# Patient Record
Sex: FEMALE | Race: WHITE
[De-identification: names, ages, dates, MRNs, and addresses within clinical notes are randomized per-mention and may not be internally consistent; named-entity substitution may affect disease eponyms.]

---

## 2017-04-19 ENCOUNTER — HOSPITAL ENCOUNTER (OUTPATIENT)
Dept: HOSPITAL 53 - M ONCR | Age: 82
End: 2017-04-19
Attending: RADIOLOGY
Payer: COMMERCIAL

## 2017-04-19 DIAGNOSIS — C52: Primary | ICD-10-CM

## 2017-04-20 NOTE — RADONC
RADIATION ONCOLOGY FOLLOWUP NOTE

 

DATE:  04/19/2017

 

CHART NUMBER:  .

 

DIAGNOSIS:  Vaginal cancer.

 

STAGE:  I, T1N0M0.

 

ECOG PERFORMANCE STATUS:  Zero.

 

FOLLOWUP NOTE:

Ms. Dowell as a very pleasant, 87-year-old white female with the diagnosis of a

stage I, T1N0M0 vaginal vault malignancy who is presenting to us today for

routine followup visit 1 year and 5 month post completion of external beam

radiation therapy.  Of note, the patient refused the brachytherapy portion of

that treatment fully aware of the consequences and risks.

 

The patient reports today that generally she is doing quite well with no

significant difficulties related to her radiation therapy.  She reports that she

was found to have elevated liver enzymes.  She does not drink alcohol.  She is

concerned for possible metastatic malignancy.

 

The patient also reports that she is being followed routinely with Dr. Ruffin

who is doing Pap smears.  She is having no vaginal discharge, urinary or bowel

difficulties or abdominal pain.

 

REVIEW OF SYSTEMS:

The patient's review of systems is noncontributory.  Denies nausea, vomiting,

fevers, chills, night sweats, diplopia, headaches, anxiety or depression,

anorexia, weight loss, visual disturbances, chest pain, urinary or bowel

difficulties, bone pain, or neurological problems.

 

PHYSICAL EXAMINATION:

The patient is a well-developed, well-nourished, 87-year-old female in no acute

distress.

HEENT exam is normocephalic, atraumatic.  Extraocular movements are intact.

There is no palpable cervical, supraclavicular, infraclavicular, axillary, or

inguinal lymphadenopathy present.

Lungs are clear to auscultation and percussion.

Heart has a regular rate and rhythm.

Abdomen is benign with no hepatosplenomegaly, masses, or tenderness.

Skeletal examination reveals no tenderness to pressure or percussion of the bony

skeleton.

Extremities reveal no clubbing, cyanosis, or edema.

Neurologic exam is grossly intact, as is the remainder of the physical

examination.

 

ASSESSMENT:

The patient is scheduled see Dr. Ruffin for GYN examination later this week.  I

have ordered a CT scan of the abdomen and pelvis to be done in order to further

investigate her liver function issues.  Once again, I am concerned for this

patient who did not complete her therapy.

 

I have tentatively scheduled the patient to come back to see us in routine

followup in 6 months' time.  The followup will be adjusted pending the results of

her CT scan as indicated.

 

 

 

 

 

cc:    *Ozzy Varma DO

       *Vaughn Koroma MD

       *Carlee Ruffin MD

       *Junior Whitehead MD

## 2017-04-27 ENCOUNTER — HOSPITAL ENCOUNTER (OUTPATIENT)
Dept: HOSPITAL 53 - M RAD | Age: 82
End: 2017-04-27
Attending: RADIOLOGY
Payer: COMMERCIAL

## 2017-04-27 DIAGNOSIS — N28.1: ICD-10-CM

## 2017-04-27 DIAGNOSIS — R94.5: ICD-10-CM

## 2017-04-27 DIAGNOSIS — K76.89: ICD-10-CM

## 2017-04-27 DIAGNOSIS — C52: Primary | ICD-10-CM

## 2017-04-27 PROCEDURE — 74178 CT ABD&PLV WO CNTR FLWD CNTR: CPT

## 2017-04-28 NOTE — REP
Clinical:  Vaginal carcinoma for restaging.

 

Technique:  Axial contrast enhanced images from the lung bases to the pubic

symphysis using oral and 100 ml Isovue 370 intravenous contrast material with

precontrast and delayed images of the abdomen as well as coronal and sagittal

re-formations.

 

Comparison:  05/18/2016.

 

Findings:

Lung bases are stable.  2 mm noncalcified nodule in the medial right middle lobe

is unchanged.  Visualized portions of the heart and pericardium are normal.

 

Liver demonstrates innumerable cavernous hemangiomas which are unchanged from

prior examinations along with small scattered hypodensities which may reflect

hepatic cysts and are also unchanged in appearance and size.  Spleen, pancreas,

gallbladder, and bilateral adrenal glands are normal.  Kidneys demonstrate stable

cystic changes including 10 cm simple right renal cyst and 5.1 cm left renal

cyst.  The enteric system is without obstruction or acute inflammatory process.

Scattered colonic diverticula noted without acute diverticulitis.  Pelvis

demonstrates normal bladder and evidence for prior hysterectomy.  No pelvic

fluid/ascites.  No free air.  No intraperitoneal or significant retroperitoneal

adenopathy.  Abdominal aorta demonstrates atherosclerotic changes without

aneurysm or dissection.  Musculoskeletal structures demonstrate age-related

degenerative changes without focal osseous abnormality.

 

Impression:

1.  Stable 2 mm nodular density in the right middle lobe.

2.  Stable hepatic cavernous hemangiomas and small hypodensities compatible with

cysts.  No evidence for hepatic metastatic disease.

3.  Stable bilateral renal cysts measuring up to 10 cm in the right kidney.

4.  Colonic diverticula without acute diverticulitis.

5.  Chronic degenerative changes to the musculoskeletal structures and

atherosclerotic changes to the vasculature.

 

 

Signed by

Vaughn Alcaraz MD 04/28/2017 03:03 A

## 2017-11-08 ENCOUNTER — HOSPITAL ENCOUNTER (OUTPATIENT)
Dept: HOSPITAL 53 - M ONCR | Age: 82
End: 2017-11-08
Attending: RADIOLOGY
Payer: COMMERCIAL

## 2017-11-08 DIAGNOSIS — C52: Primary | ICD-10-CM

## 2017-11-10 NOTE — RADONC
RADIATION ONCOLOGY FOLLOWUP

 

DATE:  11/08/2017

 

CHART NUMBER: 

 

DIAGNOSIS:

Vaginal cancer.

 

STAGE:

I, T1N0M0

 

ECOG PERFORMANCE STATUS:

0.

 

FOLLOWUP NOTE:

Ms. Dowell is a very pleasant 87-year-old white female with the diagnosis of a

stage I, T1N0M0 vaginal wall malignancy who is presenting to us today now 2 
years

post completion of the external beam portion of her treatment to once again go

over the possibility and the discussion about the brachytherapy portion of her

treatment.

 

REVIEW OF SYSTEMS:

The patient's review of systems is noncontributory.  Denies nausea, vomiting,

fevers, chills, night sweats, diplopia, headaches, anxiety or depression,

anorexia, weight loss, visual disturbances, chest pain, urinary or bowel

difficulties, bone pain, or neurological problems.

 

PHYSICAL EXAMINATION:

Deferred at this period.

 

ASSESSMENT:

Once again, I have had one of my innumerable conversations with her, perhaps

several score of conversations, including phone conversations and

others explaining to her the need for brachytherapy.  I also explained to her

that I will no longer be begging her to consider this treatment.  I made clear 
to

her once again that she never completed radiation treatments for her, at one

time, very curable malignancy.  



I went through the procedure and logistics of brachytherapy and how it works. 

I made clear once again in no uncertain circumstances the risks of an 
uncontrolled 

GYN cancer. We discussed rectovaginal fistula, as well as fistulas to the 
bladder 

and the ramifications thereof.

 

Once again, the patient said she does not know if she is going to do it. She

may want it or may not want it.  This went around in a [place], as have all

our previous conversations.

 

In summary, I left the patient telling her quite clearly what would happen

without treatment once again.  I also made clear to her that I do not consider

this a recurrence as she never completed treatment of her primary malignancy in

the first place.  Indeed, this is just continuation of her uncontrolled disease.

I made clear that the decision is up to her whether or not she wants

brachytherapy in [place].

 

I did not set of any followup for this woman in our office since she has been

totally noncompliant after many hours of discussions going back 2 years.  



I have recommended Dr. Whitehead and the physicians at [facility], who I have dealt with 
for many

years.  I have reassured her of the excellent quality of their work and their

clinical judgment.  I have also made clear that she could not be in better hands

at this point.  Indeed, we are approaching a last chance for this woman to

actually follow the advice of the excellent physicians in [place].

 

I hope this conversation has been of some benefit to her and she will heed and

understand the dire situation she is putting herself in at this point.

 

 

 

 

cc:    MD Ozzy Sheikh MD Seung Hahn, MD

 

       *MD JENNIFER Lopez

## 2018-08-08 ENCOUNTER — HOSPITAL ENCOUNTER (OUTPATIENT)
Dept: HOSPITAL 53 - M LAB | Age: 83
End: 2018-08-08
Attending: OBSTETRICS & GYNECOLOGY
Payer: COMMERCIAL

## 2018-08-08 DIAGNOSIS — C52: Primary | ICD-10-CM

## 2018-08-08 LAB
ANION GAP: 8 MEQ/L (ref 8–16)
BASO #: 0 10^3/UL (ref 0–0.2)
BASO %: 0.6 % (ref 0–1)
BLOOD UREA NITROGEN: 14 MG/DL (ref 7–18)
CALCIUM LEVEL: 9.9 MG/DL (ref 8.8–10.2)
CARBON DIOXIDE LEVEL: 29 MEQ/L (ref 21–32)
CHLORIDE LEVEL: 107 MEQ/L (ref 98–107)
CREATININE FOR GFR: 0.78 MG/DL (ref 0.55–1.3)
EOS #: 0.1 10^3/UL (ref 0–0.5)
EOSINOPHIL NFR BLD AUTO: 1.7 % (ref 0–3)
GFR SERPL CREATININE-BSD FRML MDRD: > 60 ML/MIN/{1.73_M2} (ref 32–?)
GLUCOSE, FASTING: 94 MG/DL (ref 70–100)
HEMATOCRIT: 37.3 % (ref 36–47)
HEMOGLOBIN: 12.1 G/DL (ref 12–15.5)
IMMATURE GRANULOCYTE %: 0.3 % (ref 0–3)
LYMPH #: 0.8 10^3/UL (ref 1.5–4.5)
LYMPH %: 11.8 % (ref 24–44)
MEAN CORPUSCULAR HEMOGLOBIN: 30.4 PG (ref 27–33)
MEAN CORPUSCULAR HGB CONC: 32.4 G/DL (ref 32–36.5)
MEAN CORPUSCULAR VOLUME: 93.7 FL (ref 80–96)
MONO #: 0.3 10^3/UL (ref 0–0.8)
MONO %: 4.8 % (ref 0–5)
NEUTROPHILS #: 5.3 10^3/UL (ref 1.8–7.7)
NEUTROPHILS %: 80.8 % (ref 36–66)
NRBC BLD AUTO-RTO: 0 % (ref 0–0)
PLATELET COUNT, AUTOMATED: 213 10^3/UL (ref 150–450)
POTASSIUM SERUM: 4.2 MEQ/L (ref 3.5–5.1)
RED BLOOD COUNT: 3.98 10^6/UL (ref 4–5.4)
RED CELL DISTRIBUTION WIDTH: 14.9 % (ref 11.5–14.5)
SODIUM LEVEL: 144 MEQ/L (ref 136–145)
WHITE BLOOD COUNT: 6.5 10^3/UL (ref 4–10)

## 2018-08-08 PROCEDURE — 80048 BASIC METABOLIC PNL TOTAL CA: CPT

## 2019-06-05 ENCOUNTER — HOSPITAL ENCOUNTER (OUTPATIENT)
Dept: HOSPITAL 53 - M LAB | Age: 84
End: 2019-06-05
Attending: FAMILY MEDICINE
Payer: MEDICARE

## 2019-06-05 DIAGNOSIS — I10: Primary | ICD-10-CM

## 2019-06-05 DIAGNOSIS — J44.9: ICD-10-CM

## 2019-06-05 DIAGNOSIS — C52: ICD-10-CM

## 2019-06-05 LAB
BASOPHILS # BLD AUTO: 0 10^3/UL (ref 0–0.2)
BASOPHILS NFR BLD AUTO: 0.7 % (ref 0–1)
EOSINOPHIL # BLD AUTO: 0.2 10^3/UL (ref 0–0.5)
EOSINOPHIL NFR BLD AUTO: 3.4 % (ref 0–3)
HCT VFR BLD AUTO: 39.3 % (ref 36–47)
HGB BLD-MCNC: 12.6 G/DL (ref 12–15.5)
LYMPHOCYTES # BLD AUTO: 1 10^3/UL (ref 1.5–4.5)
LYMPHOCYTES NFR BLD AUTO: 17.4 % (ref 24–44)
MCH RBC QN AUTO: 30.1 PG (ref 27–33)
MCHC RBC AUTO-ENTMCNC: 32.1 G/DL (ref 32–36.5)
MCV RBC AUTO: 94 FL (ref 80–96)
MONOCYTES # BLD AUTO: 0.3 10^3/UL (ref 0–0.8)
MONOCYTES NFR BLD AUTO: 5.7 % (ref 0–5)
NEUTROPHILS # BLD AUTO: 4.1 10^3/UL (ref 1.8–7.7)
NEUTROPHILS NFR BLD AUTO: 72.6 % (ref 36–66)
PLATELET # BLD AUTO: 185 10^3/UL (ref 150–450)
RBC # BLD AUTO: 4.18 10^6/UL (ref 4–5.4)
WBC # BLD AUTO: 5.6 10^3/UL (ref 4–10)

## 2019-07-09 ENCOUNTER — HOSPITAL ENCOUNTER (OUTPATIENT)
Dept: HOSPITAL 53 - M RAD | Age: 84
End: 2019-07-09
Attending: OBSTETRICS & GYNECOLOGY
Payer: MEDICARE

## 2019-07-09 DIAGNOSIS — Z85.41: Primary | ICD-10-CM

## 2019-07-09 PROCEDURE — 74177 CT ABD & PELVIS W/CONTRAST: CPT

## 2019-07-09 PROCEDURE — 71260 CT THORAX DX C+: CPT

## 2019-07-09 NOTE — REPVR
EXAM: 

 CT Abdomen and Pelvis With Contrast 



EXAM DATE/TIME: 

 7/9/2019 6:42 PM 



CLINICAL HISTORY: 

 89 years old, female; Condition or disease; Other: Cervical; Follow-up 

oncological assessment; No known metastasis; Tumor surgically removed: 

Hysterectomy; Current or recent treatment: Other: I dont know; Additional info: 

Mets? HX of cervical CA 



TECHNIQUE: 

 Imaging protocol: Axial computed tomography images of the abdomen and pelvis 

with intravenous contrast. Coronal and sagittal reformatted images were created 

and reviewed. 

 Radiation optimization: All CT scans at this facility use at least one of 

these dose optimization techniques: automated exposure control; mA and/or kV 

adjustment per patient size (includes targeted exams where dose is matched to 

clinical indication); or iterative reconstruction. 

 Contrast material: ; Contrast volume: 100 ml; Contrast route: IV; 



COMPARISON: 

 CT ABD PELVIS W/O FOL BY WIT 4/27/2017 3:06 PM 



FINDINGS: 



 Liver: Examination of the liver demonstrates a lobular surface contour, and 

enlargement of the left and caudate lobes, findings consistent with cirrhosis. 

Multiple hepatic hemangiomas measure up to 2.5 x 5 x 6.2 cm in the inferior 

aspect of the right lobe of the liver. Small hepatic cysts measure up to 7 mm. 

 Gallbladder and bile ducts: Normal. No calcified stones. No ductal dilation. 

 Pancreas: Normal. No ductal dilation. 

 Spleen: Normal. No splenomegaly. 

 Adrenals: Normal. No mass. 

 Kidneys and ureters: Multiple bilateral renal cysts measure up to 7.3 x 8.1 x 

8.9 cm in the right kidney. 

 Stomach and bowel: There is increased feces throughout the colon consistent 

with constipation. 

 Appendix: No evidence of appendicitis. 

 Intraperitoneal space: Normal. No free air. No significant fluid collection. 

 Vasculature: The aorta demonstrates mild atherosclerotic calcification. 

 Lymph nodes: Normal. No enlarged lymph nodes. 



 Bladder: Thickened left lateral bladder wall associated with mucosal 

enhancement, findings which may indicate inflammation, possibly secondary to 

cystitis. Clinical correlation suggested. 

 Reproductive: There has been a hysterectomy. Enhancement of the upper vagina 

or cervical remnant in the event of a prior supracervical hysterectomy. There 

is thickening of the wall with maximum wall thickness of 9 mm and overall 

cross-sectional measurements of 3.3 x 2.1 cm. Finding may represent cervical 

neoplasm considering history and should be correlated clinically. 



 Bones/joints: Osteoporosis. Levoscoliosis. Mild central spinal stenosis at 

L2-3, moderate to severe central spinal stenosis at L3-4 and L4-5. Bilateral 

facet joint arthropathy L5-S1. 

 Soft tissues: Unremarkable. 



IMPRESSION: 

1. Examination of the liver demonstrates a lobular surface contour, and 

enlargement of the left and caudate lobes, findings consistent with cirrhosis. 

2. Multiple hepatic hemangiomas measure up to 2.5 x 5 x 6.2 cm in the inferior 

aspect of the right lobe of the liver. 

3. Multiple bilateral renal cysts measure up to 7.3 x 8.1 x 8.9 cm in the right 

kidney. 

4. There has been a hysterectomy. 

5. Thickened left lateral bladder wall associated with mucosal enhancement, 

findings which may indicate inflammation, possibly secondary to cystitis. 

Clinical correlation suggested. 

6. There is increased feces throughout the colon consistent with constipation. 

7. Enhancement of the upper vagina or cervical remnant in the event of a prior 

supracervical hysterectomy. There is thickening of the upper vaginal or 

cervical wall as described above. Finding may represent cervical neoplasm 

considering history and should be correlated clinically. 



Electronically signed by: Dean Coello On 07/09/2019  19:53:48 PM

## 2019-07-09 NOTE — REPVR
EXAM: 

 CT Chest With Contrast 



EXAM DATE/TIME: 

 7/9/2019 6:42 PM 



CLINICAL HISTORY: 

 89 years old, female; Condition or disease; Other: Cancer; Follow-up 

oncological assessment; Primary cancer: Cervical; No known metastasis; Tumor 

surgically removed: Hysterectomy; Current or recent treatment: Other: Unknown; 

Additional info: Mets? HX of cervical CA 



TECHNIQUE: 

 Imaging protocol: Axial computed tomography images of the chest with 

intravenous contrast. Coronal and sagittal reformatted images were created and 

reviewed. 

 Radiation optimization: All CT scans at this facility use at least one of 

these dose optimization techniques: automated exposure control; mA and/or kV 

adjustment per patient size (includes targeted exams where dose is matched to 

clinical indication); or iterative reconstruction. 

 Contrast material: ; Contrast volume: 100 ml; Contrast route: IV; 



COMPARISON: 

 CR Chest, 2 view PA, Lat 12/9/2015 4:54 PM 



FINDINGS: 

 Lungs: Calcified granuloma right upper lobe. Mild centrilobular emphysema in 

the upper lung zones. COPD.

 Pleural space: Unremarkable. No pneumothorax. No pleural effusion. 

 Heart: Unremarkable. No cardiomegaly. No pericardial effusion. 

 Aorta: Fusiform dilatation of the ascending thoracic aorta measures 4.6 cm 

maximally. Aortic root dilated at the level of the mid sinuses of Valsalva 3.7 

cm. No dissection. The aorta demonstrates mild atherosclerotic calcification. 

 Lymph nodes: Unremarkable. No enlarged lymph nodes. 

 Bones/joints: The spine demonstrates mild degenerative changes. 

 Soft tissues: Unremarkable. 



IMPRESSION: 

1. Fusiform dilatation of the ascending thoracic aorta measures 4.6 cm 

maximally. Aortic root dilated at the level of the mid sinuses of Valsalva 3.7 

cm. No dissection. 

2. Mild centrilobular emphysema. COPD.



Electronically signed by: Dean Coello On 07/09/2019  19:14:32 PM

## 2019-07-30 VITALS — SYSTOLIC BLOOD PRESSURE: 158 MMHG | DIASTOLIC BLOOD PRESSURE: 93 MMHG

## 2019-07-31 NOTE — MEDONC
MEDICAL ONCOLOGY PROGRESS NOTE

 

DATE OF SERVICE: 2019

 

The patient is here today on evaluation of vaginal carcinoma.

 

 

 This is a very pleasant 89-year-old white female who had initially presented in

2015 with an area that was redness around the area in the vagina.  The

biopsy was consistent with vaginal carcinoma in situ where she had been referred

to the gynecological oncologist who did a biopsy showing squamous cell carcinoma.

An MRI of the pelvis had shown a mass in the vaginal area 2 x 2 x 1.5 x 2.2 and a

PET scan was done which had shown no signs of any distant metastatic disease or

any disease extension.  The patient was given pelvic radiation therapy here at

[facility] which she completed in 2015, total dose was 4500

gray.  It was also recommended that she receive interstitial brachytherapy but

the patient had declined that treatment.  Subsequently, the patient had gone for

CO2 laser ablation in  due to vaginal dysplasia and in 2017 she had

developed a high-grade squamous intraepithelial lesion.  A Pap smear in 2017

was suspicious for squamous cell carcinoma and a vaginal biopsy had shown vaginal

carcinoma in situ.  MRI had been done in  showing no invasion of the bladder

or rectum and she had then undergone JOY brachytherapy treatment which she

completed in 2017.  Status post completion, she had a vaginal biopsy in

2019 showing invasive squamous cell carcinoma.  She then had a staging

examination which had shown a CT scan of the chest which had shown a calcified

granuloma in the right upper lung and on the abdomen and pelvis the liver showed

multiple hepatic hemangiomas, but no evidence of any disease.  The patient is no

longer a candidate for any further radiation therapy due to her prior dosing

brachytherapy etc and she had been seen by Dr. Ruffin in [place] who had

recommended that her options would be chemotherapy, with possibly carboplatinum,

Taxol and Avastin.

 

PAST MEDICAL HISTORY:

The patient's past medical history is above noted for vaginal carcinoma and

radiation therapy.  She is status post hysterectomy in 1970 and status post

breast lumpectomy in , benign disease.  She has a history of hypertension,

hypothyroidism, osteoporosis, peptic ulcer disease, arthritis and varicose

veins.

 

FAMILY HISTORY

Mother  at the age of 66.  Her father  at the age of 62.  She has

children who were 55, 62 and 65 years of age.

 

REVIEW OF SYSTEMS:

She denies any abdominal pain, any vaginal bleeding, any discomfort on sitting.

She has had no discharge.  She has had no recent problems with any dental work,

vision.  She does not wear glasses.  She has had no problems swallowing, chest

pain, hemoptysis or palpitations.  No lower leg swelling.

 

PHYSICAL EXAMINATION:

The patient's appearance is younger than stated age of 89.  Her height is 160 cm.

Her BSA is 1.56, BMI is 21.4.

Vital signs: Her temperature is 96.8, pulse is 65, respiratory rate is 18, BP is

158/93, pulse oximetry is 99.

Her HEENT is normocephalic, atraumatic.  PERRL.  EOMI.  Sclerae is otherwise

white, is nonicteric.  Oropharynx is otherwise clear.  Her neck is supple with no

adenopathy.  Chest is decreased breath sounds at the bases.

Cardiovascular:  S1 and S2 are appreciated with no murmurs.

Her abdomen is otherwise soft, nontender.  No hepatosplenomegaly.  No ascites.

No guarding, no rebound.

Her extremities show no cyanosis, no clubbing or any edema.

 

LABORATORY:

On her laboratory examination her WBC count is 5.6, hemoglobin is 12.6 over 39.3,

RDW of 15.1, platelets 185, neutrophils of 76, lymphocytes of 17, serum

chemistries show are currently pending.

 

ASSESSMENT:

At this time is recurrent vaginal carcinoma with no signs of any metastatic

disease in an 89-year-old white female with marked heavily pretreated therapy now

no longer a candidate for radiation therapy.

 

PLAN:

I have had a discussion with the patient.  At the age of 89 she does not wish to

pursue any chemotherapy options.  I have gone over the risks, side effects and

benefits of chemotherapy and at this point the patient would like to have

observation only.  She has no symptoms.  No abdominal pain.  No vaginal

discharge.  No bleeding and will be coming up every 3 months on routine followup.

If at that time she has a change of symptoms and then palliative therapies will

be considered at that time.  Her choice appears reasonable considering the low

bulk of disease and the lack of symptoms.

 

 

 

Electronically Signed by

Vanessa Skinner MD 2019 10:38 A

 

 

 

 

 

 

 

 

DD:  Vanessa Skinner MD 2019 09:52 A

DT:  np 2019 10:09 P

 

CC:   Ozzy Varma MD

## 2019-10-30 VITALS — SYSTOLIC BLOOD PRESSURE: 143 MMHG | DIASTOLIC BLOOD PRESSURE: 91 MMHG

## 2019-10-30 LAB
ALBUMIN SERPL BCG-MCNC: 3.4 GM/DL (ref 3.5–5.2)
AMORPH SED URNS QL MICRO: (no result)
APPEARANCE UR: (no result)
BACTERIA UR QL AUTO: NEGATIVE
BILIRUB UR QL STRIP.AUTO: NEGATIVE
BUN SERPL-MCNC: 14 MG/DL (ref 6–20)
CAOX CRY URNS QL MICRO: (no result)
CHLORIDE SERPL-SCNC: 106 MMOL/L (ref 98–107)
CO2 SERPL-SCNC: 30 MEQ/L (ref 23–31)
CREAT SERPL-MCNC: 0.71 MG/DL (ref 0.6–1.1)
GFR SERPL CREATININE-BSD FRML MDRD: > 60 ML/MIN/{1.73_M2} (ref 32–?)
GLUCOSE SERPL-MCNC: 131 MG/DL (ref 70–105)
GLUCOSE UR QL STRIP.AUTO: NEGATIVE MG/DL
HCT VFR BLD AUTO: 37 % (ref 36–47)
HGB BLD-MCNC: 12.2 G/DL (ref 12–15.5)
HGB UR QL STRIP.AUTO: (no result)
KETONES UR QL STRIP.AUTO: NEGATIVE MG/DL
LEUKOCYTE ESTERASE UR QL STRIP.AUTO: (no result)
LYMPHOCYTES NFR BLD AUTO: 15.2 % (ref 24–44)
MCH RBC QN AUTO: 31.7 PG (ref 27–33)
MCHC RBC AUTO-ENTMCNC: 33 G/DL (ref 32–36.5)
MCV RBC AUTO: 96.1 FL (ref 80–96)
MUCOUS THREADS URNS QL MICRO: (no result)
NEUTROPHILS # BLD AUTO: 6.7 10^3/UL (ref 1.8–7.7)
NEUTROPHILS NFR BLD AUTO: 80.7 % (ref 36–66)
NITRITE UR QL STRIP.AUTO: NEGATIVE
PH UR STRIP.AUTO: 6 UNITS (ref 5–9)
PLATELET # BLD AUTO: 229 10^3/UL (ref 150–450)
PROT SERPL-MCNC: 6.3 GM/DL (ref 6.4–8.3)
PROT UR QL STRIP.AUTO: (no result) MG/DL
RBC # BLD AUTO: 3.85 10^6/UL (ref 4–5.4)
RBC # UR AUTO: 10 /HPF (ref 0–3)
SODIUM SERPL-SCNC: 142 MMOL/L (ref 135–145)
SP GR UR STRIP.AUTO: 1.01 (ref 1–1.03)
SQUAMOUS #/AREA URNS AUTO: 0 /HPF (ref 0–6)
UROBILINOGEN UR QL STRIP.AUTO: 2 MG/DL (ref 0–2)
WBC # BLD AUTO: 8.3 10^3/UL (ref 4–10)
WBC #/AREA URNS AUTO: 47 /HPF (ref 0–3)

## 2019-10-31 NOTE — MEDONC
HEMATOLOGY/ONCOLOGY PROGRESS NOTE

 

DATE OF SERVICE:  10/30/2019

 

Treatment history :

 

1.The patient had been diagnosed with squamous cell carcinoma in 2015  and

was treated with chemotherapy and radiation.  She completed this in 2015.

The patient was given pelvic radiation therapy here at 

which she completed in 2015, total dose was 4500 gray.  It was also

recommended that she receive interstitial brachytherapy but the patient had

declined that treatment.  Subsequently, the patient had gone for

 2. Underwent  CO2 laser ablation surgery in  due to vaginal dysplasia.

 

2. Disease  progression in   2017  she developed   a high grade  squamous

intraepithelial lesion. The  patient had  been  advised  to have  chemotherapy

with carboplatin, Taxol and or Avastin.  The patient had declined due to her

stated age.

 

3.   A Pap smear in 2017  was suspicious for squamous cell carcinoma and a

vaginal biopsy had shown vaginal carcinoma in situ.  MRI had been done in 

showing no invasion of the bladder or rectum and she had then undergone JOY

brachytherapy treatment which she completed in 2017.

 

4. Status post completion, she had a vaginal biopsy in  2019 showing

invasive squamous cell carcinoma.  She then had a staging  examination which had

shown a CT scan of the chest which had shown a calcified  granuloma in the right

upper lung and on the abdomen and pelvis the liver showed   multiple hepatic

hemangiomas, but no evidence of any disease.  The patient is no longer a

candidate for any further radiation therapy due to her prior dosing

brachytherapy etc and she had been seen by Dr. Ruffin in [place] who had

recommended that her options would be chemotherapy, with possibly carboplatinum,

Taxol and Avastin. Again declined   by the patient .

 

 

PAST MEDICAL HISTORY:

The patient's past medical history is above noted for vaginal carcinoma and

radiation therapy.  She is status post hysterectomy in 1970 and status post

breast lumpectomy in , benign disease.  She has a history of hypertension,

hypothyroidism, osteoporosis, peptic ulcer disease, arthritis and varicose

veins.

 

FAMILY HISTORY

Mother  at the age of 66.  Her father  at the age of 62.  She has

children who were 55, 62 and 65 years of age.

 

REVIEW OF SYSTEMS:

She denies any abdominal pain, any vaginal bleeding, any discomfort on sitting.

She has had no discharge.  She has had no recent problems with any dental work,

vision.  She does not wear glasses.  She has had no problems swallowing, chest

pain, hemoptysis or palpitations.  No lower leg swelling.

She has  swelling and pain in the labial area  and a burning sensation noted .

NO  improvement  with local measures . Pain  on  sitting  . Shifts her  position

to  get comfortable .

 

 

 

CURRENT ALLERGIES:

SULFA MEDICATIONS.

 

MEDICATIONS:

Include alprazolam, levothyroxine, quinapril.

 

REVIEW OF SYSTEMS:

She states that she has burning on urination and that she has some tenderness

around the vaginal vault area and she feels as if she has some tenderness deep

inside or the lower end of the vagina and into the labial area as well.  She says

she will have occasional bleeding, but it has been very rare and if she does it

has been spotted.  The remainder of her 12-point review of systems is negative.

 

PHYSICAL EXAMINATION:

Her ECOG is about 2/4.  Her temperature is 98.4, pulse is 103, respiratory rate

is 18, BP is 143/91 and her pulse oximetry is 98.

She has generalized thinness.  She has muscle wasting throughout.

HEENT is normocephalic, atraumatic.  PERRL.  EOMI.  Sclerae is white, nonicteric.

Oropharynx is otherwise clear.  Neck:  Supple.

Chest:  Decreased breath sounds at the bases.

Cardiovascular:  S1-S2 are appreciated with no murmurs.

Her abdomen is otherwise soft.

The introitus area shows swollen bilateral labia as well as a necrotic smell

coming from the vaginal area.  The vaginal vault appears to have necrotic tissue

within it.  There is no active bleeding.

 

ASSESSMENT:

1.  Progressive disease.

2.  Overgrowth of bacterial vaginosis.

 

PLAN:

I have advised that the patient take Flagyl 500 mg by mouth q.8 h for at least 10

days.  Also some topical vaginal ointment to help with some of the odor.  A full

examination of the patient's vaginal area would have to be done under anesthesia

and I have had discussions with the patient about pursuing possible other

therapeutic options.  The patient has declined any further chemotherapy and as a

result imaging studies would only be done if we were going to pursue active

treatment.  At this point, symptom management can be done as well as

consideration for palliative care consultation in the near future.  I have

explained to the patient and her daughter that tumor erosion is expected to

proceed.

 

 

 

Electronically Signed by

Vanessa Skinner MD 10/31/2019 03:09 P

 

 

 

 

 

 

 

 

DD:  Vanessa Skinner MD 10/30/2019 04:10 P

DT:  huy 10/31/2019 09:07 A

 

CC:  Ozzy Varma MD

## 2019-12-30 VITALS — SYSTOLIC BLOOD PRESSURE: 152 MMHG | DIASTOLIC BLOOD PRESSURE: 88 MMHG

## 2019-12-30 LAB
ALBUMIN SERPL BCG-MCNC: 2.6 GM/DL (ref 3.2–5.2)
ALT SERPL W P-5'-P-CCNC: 19 U/L (ref 12–78)
BASOPHILS # BLD AUTO: 0.1 10^3/UL (ref 0–0.2)
BASOPHILS NFR BLD AUTO: 0.6 % (ref 0–1)
BILIRUB SERPL-MCNC: 0.6 MG/DL (ref 0.2–1)
BUN SERPL-MCNC: 14 MG/DL (ref 7–18)
CALCIUM SERPL-MCNC: 10.8 MG/DL (ref 8.8–10.2)
CHLORIDE SERPL-SCNC: 105 MEQ/L (ref 98–107)
CO2 SERPL-SCNC: 30 MEQ/L (ref 21–32)
CREAT SERPL-MCNC: 0.64 MG/DL (ref 0.55–1.3)
EOSINOPHIL # BLD AUTO: 0.1 10^3/UL (ref 0–0.5)
EOSINOPHIL NFR BLD AUTO: 1.6 % (ref 0–3)
GFR SERPL CREATININE-BSD FRML MDRD: > 60 ML/MIN/{1.73_M2} (ref 32–?)
GLUCOSE SERPL-MCNC: 107 MG/DL (ref 70–100)
HCT VFR BLD AUTO: 36 % (ref 36–47)
HGB BLD-MCNC: 11.5 G/DL (ref 12–15.5)
LYMPHOCYTES # BLD AUTO: 0.7 10^3/UL (ref 1.5–5)
LYMPHOCYTES NFR BLD AUTO: 8.4 % (ref 24–44)
MCH RBC QN AUTO: 31.3 PG (ref 27–33)
MCHC RBC AUTO-ENTMCNC: 31.9 G/DL (ref 32–36.5)
MCV RBC AUTO: 97.8 FL (ref 80–96)
MONOCYTES # BLD AUTO: 0.5 10^3/UL (ref 0–0.8)
MONOCYTES NFR BLD AUTO: 5.4 % (ref 0–5)
NEUTROPHILS # BLD AUTO: 7.3 10^3/UL (ref 1.5–8.5)
NEUTROPHILS NFR BLD AUTO: 83.7 % (ref 36–66)
PLATELET # BLD AUTO: 172 10^3/UL (ref 150–450)
POTASSIUM SERPL-SCNC: 3.5 MEQ/L (ref 3.5–5.1)
PROT SERPL-MCNC: 6.8 GM/DL (ref 6.4–8.2)
RBC # BLD AUTO: 3.68 10^6/UL (ref 4–5.4)
SODIUM SERPL-SCNC: 142 MEQ/L (ref 136–145)
WBC # BLD AUTO: 8.7 10^3/UL (ref 4–10)

## 2019-12-31 LAB — CANCER AG15-3 SERPL-ACNC: 15.6 U/ML (ref ?–32.4)

## 2019-12-31 NOTE — MEDONC
MEDICAL ONCOLOGY OFFICE NOTE

 

DATE OF ENCOUNTER:  2019

 

IDENTIFICATION AND CHIEF COMPLAINT:

 

Jagruti Dowell is a key 89-year-old  woman with a history of squamous

cell carcinoma of the vagina, now recurrent, who returns for reevaluation,

reporting "I have pain in my pelvis most of the time.  It is about a 4 or 5 out

of 10 in intensity."

 

HISTORY OF PRESENT ILLNESS:

 

Jagruti Dowell is a key 89-year-old woman whose history of present illness

dates to , when she was first diagnosed as having squamous cell carcinoma of

the vulva.  The patient's cancer was treated using a combination chemotherapy and

external beam radiation, completed in 2015, to a total of 4500 cGy.

Although brachytherapy was recommended she declined that treatment.  She

subsequently underwent carbon dioxide laser ablation in  as therapy of

vaginal dysplasia.  In 2017, she was found to have high-grade squamous

intraepithelial neoplasia and was advised to have systemic therapy using

carboplatin, Taxol, and Avastin.  She declined at that time.  Followup Pap smear

in 2017 was suspicious for squamous cell carcinoma and vaginal biopsy showed

vaginal carcinoma in situ.  She then underwent JOY brachytherapy in 2017.  However, followup biopsy in 2019 showed invasive squamous cell

carcinoma.  Imaging showed multiple hemangiomas in the liver, and the patient was

evaluated by Dr. Ruffin of Gynecologic Oncology in [place], [place], who again

recommended systemic therapy using carboplatin with Taxol and Avastin.  The

patient declined therapy at that time.  She was last evaluated by Vanessa Skinnre MD

in this practice on 10/13/2019.

 

Ms. Dowell returns at this time, reporting that pelvic discomfort has been

increasing over time, currently rated 4 out of 10 in intensity.  She also reports

dysuria.  She has had no recent fevers, chills or sweats, and otherwise is

feeling well although fatigued.  Her overall Karnofsky performance status is

estimated at 80% at this time.

 

ALLERGIES:

 

The patient reports allergies to SULFONAMIDE ANTIBIOTICS.

 

CURRENT MEDICATIONS:

 

- calcium/vitamin D 1 tablet p.o. q. day

- levothyroxine 158 mcg p.o. q. day

- multivitamin 1 tablet p.o. q. day

- quinapril 20 mg p.o. q. day

- sertraline 50 mg p.o. day

 

PAST MEDICAL HISTORY:

 

The patient has a past history significant for squamous cell carcinoma of the

vagina as detailed above.  There is history of hypothyroidism and history of

hypertension.  The patient is  3, para 3 and underwent hysterectomy in the

past.  There is also a history of a benign breast lesion for which she underwent

lumpectomy in the distant past.  There is history of ocular cataracts both of

which have had been excised.  There is history of degenerative joint disease as

well as peptic ulcer disease.  The patient also carries a diagnosis of

osteoporosis.

 

SOCIAL HISTORY:

 

Tobacco:  The patient smoked 1-2 packs per day for 20 years from the ages of

[age] but quit more than 40 years ago.  Alcohol:  The patient consumed alcohol

socially in the distant past.  Illicit drug use:  There is no history of illicit

drug use.

 

FAMILY HISTORY:

 

The patient's mother  at age of 66 from organic heart disease.  The patient's

father  at the age of 62 from organic heart disease.  The patient has three

grown children in their 50s and 60s.

 

REVIEW OF SYSTEMS:

 

Neurologic:  No history of head trauma, seizure disorder or focal neurologic

deficits.

Respiratory:  No shortness of breath.  No chest pain.  No cough.

Cardiac:  No history of myocardial infarction.  No exertional chest pressure.  No

palpitations.  No orthopnea.  No leg edema.

Gastrointestinal:  No recent nausea, vomiting, abdominal pain or diarrhea.

Genitourinary:  Dysuria as noted above with vaginal discomfort as detailed above.

No hematuria.  No discharge.  No history of nephrolithiases.

Constitutional:  No recent fevers, chills or sweats, although the patient does

report fatigue.

The remainder of the review of systems was obtained and was negative.

 

PHYSICAL EXAMINATION:

 

The patient is a thin, well-developed, well-nourished  woman awake,

alert and fully oriented, friendly and cooperative, who appears markedly younger

than her stated age.

Temperature 98.1, pulse 94, respirations 18, blood pressure 152/88, oxygen

saturation 95% on room air.

Skin:  Full turgor, anicteric and without lesions.

HEENT Examination:  Normocephalic, atraumatic.  Pupils react.  Extraocular

muscles intact.  Sclerae anicteric.  Oropharynx without lesions.

Neck:  Supple, without thyromegaly.

Lymphatics:  No pathologic lymphadenopathy noted in the cervical,

supraclavicular, axillary or inguinal regions.

Lungs:  Clear to auscultation.

Cardiac Exam:  Regular rhythm, point of maximal impulse nondisplaced.  S1, S2,

without gallop, rub or murmur.  Full pulses.

Breast Examination:  Deferred.

Abdomen:  Active bowel sounds, soft, flat, nontender, without appreciable

organomegaly.

Pelvic Examination:  Deferred.

Rectal Examination:  Deferred.

Extremities:  Without clubbing, cyanosis or edema.

Neurologic Exam:  Mental status intact.  Cranial nerves intact.  Motor and

sensory grossly intact.

 

LABORATORY DATA:

 

Laboratory studies dated 2019 include the following.  White blood count

8700 per microliter, hemoglobin 11.5 g/dL, hematocrit 36%, platelet count

172,000.  BUN 14, creatinine 0.64 mg/dL.  Calcium mildly elevated at 10.8 mg/dL.

Albumin depressed to 2.6 g/dL.  Alkaline phosphatase elevated to 274.  Total

bilirubin 0.6 mg/dL.

 

IMPRESSION:

 

Squamous cell carcinoma of the vagina.  The patient has evidence by biopsy of

active disease, with laboratory data at this time showing moderate hypercalcemia

in the setting of elevated alkaline phosphatase.  This is worrisome for bone

metastases.  The patient's creatinine remains normal, and no emergent

intervention is required, however she would likely benefit from use of

bisphosphonate therapy.  However, it would be most appropriate to stage the

patient with PET/CT scan.  Systemic therapy was again reviewed with the patient

and on this occasion she appeared amendable to consider treatment using

carboplatin with paclitaxel and Avastin.  The patient signed consent for

chemotherapy and orders will be written, however the patient will require staging

first and would likely benefit from bisphosphonate therapy.

 

PLAN:

 

Ms. Dowell was asked to undergo PET/CT scan and an order was placed for this.

Ideally this will be performed within the next 1-2 weeks.  The patient was asked

to return in approximately 10 days time for reevaluation.  In the interim, she

was asked to aggressively hydrate.  It is likely that pamidronate will be

prescribed 90 mg intravenously over 90 minutes, in an attempt to normalize

calcium.  This remains to be scheduled at the present time.  The patient was

asked to contact this practitioner if problems arise prior to her scheduled

return appointment and she would be seen sooner.

 

 

 

Electronically Signed by

Taras Britton MD 2019 05:00 P

 

 

 

 

 

 

 

 

DD:  Taras Britton MD 2019 07:37 P

DT:  rowan 2019 06:18 A

 

CC:

## 2020-01-21 ENCOUNTER — HOSPITAL ENCOUNTER (OUTPATIENT)
Dept: HOSPITAL 53 - M PLARAD | Age: 85
End: 2020-01-21
Attending: INTERNAL MEDICINE
Payer: MEDICARE

## 2020-01-21 DIAGNOSIS — C51.9: Primary | ICD-10-CM

## 2020-01-21 PROCEDURE — 78815 PET IMAGE W/CT SKULL-THIGH: CPT

## 2020-01-22 NOTE — REP
PET/CT:

 

History: Restaging squamous cell carcinoma of the vagina, recurrent.  First

diagnosed in 2015 in the vulva treated with combination chemotherapy and external

beam radiation therapy.

 

Comparisons: Comparison PET/CT studies September 10, 2015.

 

TECHNIQUE:

 

56 minutes following the intravenous injection of a 8.34 mCi dose of F-18 FDG,

three-dimensional PET scintigraphy is acquired from the skull base to the

proximal thighs. Triplanar noncontrast CT scanning is acquired through the same

anatomic range for attenuation correction, and image registration with scan

parameters optimized to minimize radiation exposure to the patient. PET

scintigraphy and CT datasets were fused and displayed on a workstation with

multiplanar and projection display capability.

 

PET/CT Findings: The head and neck soft tissues are unremarkable except for the

presence of mildly hypermetabolic uptake diffusely noted in the thyroid gland

bilaterally.  There is no evidence of thyroid mass or nodule.  This can be normal

variant.  There is no evidence of hypermetabolic cervical or supraclavicular

adenopathy.

 

There is no abnormal hypermetabolic uptake within the thorax.  No evidence of

adenopathy or pulmonary parenchymal nodule is seen.

 

In the abdomen and pelvis there is no abnormal hypermetabolic uptake in the

liver.  This  patient has several low-density liver lesions consistent with

hemangiomas by prior imaging.  These are not hypermetabolic.  There is no

evidence of hypermetabolic adenopathy in the abdomen or pelvis. There are

bilateral renal cysts again noted right larger than left.

 

There is a large  inferior central pelvic mass involving the upper, and to a

lesser extent mid and lower vagina.  This area is posterior and inferior to the

bladder and is markedly hypermetabolic.  It measures approximately 7 cm right to

left by 4.2 cm in greatest anteroposterior dimension by 7.4 cm in craniocaudal

dimension.  Maximum standard uptake value in the more superior and largest

portion of this mass is 27.29.  The inferior vaginal walls are hypermetabolic as

well, maximum SUV value 8.68.  There is no observable pelvic or inguinal

adenopathy.  No other abnormal hypermetabolic focus is seen.  No hypermetabolic

skeletal focus is seen.

 

Impression:

 

There is normal variant bilateral symmetric thyroid uptake similar to the prior

study.  There is a large hypermetabolic vaginal mass.  There is no discernible

hypermetabolic adenopathy.  No other hypermetabolic uptake seen.

 

 

Electronically Signed by

Uvaldo Holt MD 01/22/2020 01:15 P

## 2020-01-24 VITALS — SYSTOLIC BLOOD PRESSURE: 155 MMHG | DIASTOLIC BLOOD PRESSURE: 90 MMHG

## 2020-01-24 LAB
ALBUMIN SERPL BCG-MCNC: 2.6 GM/DL (ref 3.2–5.2)
ALT SERPL W P-5'-P-CCNC: 19 U/L (ref 12–78)
BASOPHILS # BLD AUTO: 0.1 10^3/UL (ref 0–0.2)
BASOPHILS NFR BLD AUTO: 0.5 % (ref 0–1)
BILIRUB SERPL-MCNC: 0.7 MG/DL (ref 0.2–1)
BUN SERPL-MCNC: 11 MG/DL (ref 7–18)
CALCIUM SERPL-MCNC: 10.5 MG/DL (ref 8.8–10.2)
CANCER AG15-3 SERPL-ACNC: 14.2 U/ML (ref ?–32.4)
CHLORIDE SERPL-SCNC: 104 MEQ/L (ref 98–107)
CO2 SERPL-SCNC: 32 MEQ/L (ref 21–32)
CREAT SERPL-MCNC: 0.68 MG/DL (ref 0.55–1.3)
EOSINOPHIL # BLD AUTO: 0.1 10^3/UL (ref 0–0.5)
EOSINOPHIL NFR BLD AUTO: 1 % (ref 0–3)
GFR SERPL CREATININE-BSD FRML MDRD: > 60 ML/MIN/{1.73_M2} (ref 32–?)
GLUCOSE SERPL-MCNC: 107 MG/DL (ref 70–100)
HCT VFR BLD AUTO: 36 % (ref 36–47)
HGB BLD-MCNC: 11.5 G/DL (ref 12–15.5)
LYMPHOCYTES # BLD AUTO: 0.9 10^3/UL (ref 1.5–5)
LYMPHOCYTES NFR BLD AUTO: 8.2 % (ref 24–44)
MCH RBC QN AUTO: 31.1 PG (ref 27–33)
MCHC RBC AUTO-ENTMCNC: 31.9 G/DL (ref 32–36.5)
MCV RBC AUTO: 97.3 FL (ref 80–96)
MONOCYTES # BLD AUTO: 0.5 10^3/UL (ref 0–0.8)
MONOCYTES NFR BLD AUTO: 4.4 % (ref 0–5)
NEUTROPHILS # BLD AUTO: 9.4 10^3/UL (ref 1.5–8.5)
NEUTROPHILS NFR BLD AUTO: 85.4 % (ref 36–66)
PLATELET # BLD AUTO: 293 10^3/UL (ref 150–450)
POTASSIUM SERPL-SCNC: 2.7 MEQ/L (ref 3.5–5.1)
PROT SERPL-MCNC: 6.7 GM/DL (ref 6.4–8.2)
RBC # BLD AUTO: 3.7 10^6/UL (ref 4–5.4)
SODIUM SERPL-SCNC: 140 MEQ/L (ref 136–145)
WBC # BLD AUTO: 11 10^3/UL (ref 4–10)

## 2020-01-25 NOTE — MEDONC
MEDICAL ONCOLOGY OFFICE NOTE:

 

DATE OF ENCOUNTER:  01/24/2020

 

IDENTIFICATION AND CHIEF COMPLAINT:

 

Jagruti Dowell as a key 89-year-old  woman with history of squamous

cell carcinoma of the vagina now recurrent who returns for re-evaluation

reporting "my pain is not so bad today".

 

HISTORY OF PRESENT ILLNESS:

 

Jagruti Dowell as a key 89-year-old woman whose history present illness dates

to 2015 when she was first diagnosed as having squamous cell carcinoma of the

vulva.  She was treated at that time using a combination of chemotherapy with

external beam radiation therapy with treatment completed in November 2015, the

patient having received a total of 4500 cGy.  Although brachytherapy was also

recommended, she declined that treatment.  She subsequently underwent carbon

dioxide laser ablation in 2016 as therapy of vaginal recurrence.  In April 2017,

she was found to have high-grade squamous intraepithelial neoplasia and was

advised to have systemic therapy using carboplatin/Taxol and Avastin.  She

declined treatment at that time.  Followup Pap smear in July 2017 was suspicious

for squamous cell carcinoma and vaginal biopsy documented neoplasia. she then

underwent JOY brachytherapy in December 2017.  However, followup biopsy in June 2019 showed invasive squamous cell carcinoma.  Imaging studies showed multiple

hemangiomas in the liver and the patient was evaluated by Dr. Ruffin of

gynecologic oncology, who again recommended systemic therapy using carboplatin

with Taxol and Avastin.  The patient declined therapy.  She was last seen in this

practice on December 13, 2019 at which time she again declined therapy.  She

returns at this time reporting that pelvic discomfort persists who has not

changed significantly since her visit of December 2019.  She is accompanied by

two daughters to this encounter to discuss treatment options.  She has had no

fevers, chills or sweats.  The patient's daughters report that the patient is

extremely forgetful.

 

ALLERGIES:

 

The patient reports allergies to SULFONAMIDE ANTIBIOTICS.

 

CURRENT MEDICATIONS:

 

- calcium/vitamin D 1 tablet p.o. daily

- levothyroxine 158 mcg p.o. daily

- multivitamins 1 tablet p.o. daily

- quinapril 20 mg p.o. daily

- sertraline 50 mg p.o. daily

 

PAST MEDICAL HISTORY, SOCIAL HISTORY AND FAMILY HISTORY:

 

The patient has past medical, social and family history are as documented in

medical oncology office notes of December 30, 2019

 

REVIEW OF SYSTEMS:

 

NEUROLOGIC:  No history of head trauma, seizure disorder or focal neurologic

deficits although the patient does have forgetfulness.

RESPIRATORY:  No shortness of breath or chest pain.  No cough.

CARDIAC:  No history of myocardial infarction.  No exertional chest pressure.  No

palpitations.  No orthopnea.  No leg edema.

GASTROINTESTINAL:  No recent nausea, vomiting, abdominal pain or diarrhea.

GENITOURINARY:  Intermittent dysuria associated with vaginal discomfort.  No

hematuria.  No discharge.  No history of nephrolithiases.

Constitutional:  No recent fevers, chills or sweats.  The patient does report

easy fatigability.

The remainder of the review of systems was obtained and was negative.

 

PHYSICAL EXAMINATION:

 

The patient is a thin well-developed, well-nourished  woman awake, alert

and fully oriented, friendly and cooperative in no distress who appears markedly

younger than her stated age

Temperature 98.0, pulse 87, respirations 18, blood pressure 155/90, oxygen

saturation 96% on room air.

SKIN:  Full turgor anicteric and without open lesions.

HEENT:  Examination normocephalic, atraumatic.  Pupils round and reactive extra

muscles intact.  Sclerae anicteric.  Oropharynx without lesions.

NECK:  Supple without thyromegaly.

LYMPHATICS:  No pathologic lymphadenopathy noted.

LUNGS:  Clear to auscultation.

CARDIAC EXAM:  Regular rhythm.  Point of maximal impulse nondisplaced.  S1, S2,

without gallop or murmur.

BREAST EXAMINATION:  Deferred.

ABDOMEN:  Active bowel sounds, soft, flat, nontender without appreciable

organomegaly

PELVIC EXAMINATION:  Deferred.

RECTAL EXAMINATION:  Deferred.

EXTREMITIES:  Without clubbing, cyanosis or edema.

NEUROLOGIC EXAMINATION:  Mental status intact.  Cranial nerves intact.  Motor and

sensory grossly intact.

 

LABORATORY DATA:

 

Laboratory data from January 24, 2020 include the following:  White blood count

11,000 per microliter, hemoglobin 11.5 gm/dL, hematocrit 36% platelet count

293,000, BUN 11, creatinine 0.68 mg/dL, glucose 107 mg/dL, calcium mildly

elevated at 10.5 mg/dL, albumin 2.6 mg/dL, carcinoembryonic antigen elevated at

4.7 ng/mL.

 

PET/CT scan performed January 21, 2020 shows a large inferior central pelvic mass

involving the upper and to a lesser extent, the mid and lower vagina.  It is

markedly hypermetabolic.  It measures 7 cm right to left x 4.2 cm in greatest

anteroposterior dimension and 7.4 cm in craniocaudal dimension.  Maximal standard

uptake value was 27.29.  No other abnormal hypermetabolic foci are seen.

 

IMPRESSION:

 

Squamous cell carcinoma of the vagina, locally advanced.  The patient has locally

advanced disease without distant metastases clinically.  There is some

possibility that systemic anti neoplastic therapy which shrink the mass and could

potentially make the patient eligible for surgery.  This was discussed at length

in the conversation with the patient and her two daughters.  Discussion of

management options began at 10 minutes after 8:00 a.m. and ended at 9:30 a.m. for

more than 1 hour spent in discussing treatment options of her cancer.  It was

noted that referral to hospice in the event that the patient did not wish to have

treatment options considered within standard of care as listed on the national

comprehensive cancer center network website include single-agent taxane,

combination therapy with platinum, and with a combination of therapy with Avastin

has been recommended by Dr. Ruffin of gynecologic oncology at [facility] in [place].  The combination of cisplatin and paclitaxel and Avastin is

considered the preferred regimen for recurrent disease per NCCN guidelines

version 2.2019.  The patient's daughters were inclined to be supportive of the

regimen of Avastin with Abraxane and this will be submitted for insurance

preauthorization.  However, the patient has not committed yet to that regimen.

 

PLAN:

 

Chemotherapy orders will be placed for a regimen that includes Abraxane and

Avastin, while the patient considers this as an option.  The patient was noted to

have a depressed serum potassium level at 2.7 and oral potassium replacement was

prescribed with potassium chloride.  Ms. Dowell was asked to return in two

week's time to initiate therapy, if that is her ultimate decision. The patient

and her daughters concurred with this plan.

 

 

Electronically Signed by

Taras Britton MD 01/28/2020 07:59 A

 

 

 

 

 

 

 

 

DD:  Taras Britton MD 01/24/2020 01:30 P

DT:  sharmaine 01/25/2020 03:06 P

 

CC:

## 2020-02-11 ENCOUNTER — HOSPITAL ENCOUNTER (OUTPATIENT)
Dept: HOSPITAL 53 - M ONCM | Age: 85
Discharge: HOME | End: 2020-02-11
Attending: INTERNAL MEDICINE
Payer: MEDICARE

## 2020-02-11 VITALS — DIASTOLIC BLOOD PRESSURE: 74 MMHG | SYSTOLIC BLOOD PRESSURE: 117 MMHG

## 2020-02-11 VITALS — BODY MASS INDEX: 18.09 KG/M2 | WEIGHT: 102.07 LBS | HEIGHT: 63 IN

## 2020-02-11 DIAGNOSIS — I10: ICD-10-CM

## 2020-02-11 DIAGNOSIS — I83.009: ICD-10-CM

## 2020-02-11 DIAGNOSIS — E03.9: ICD-10-CM

## 2020-02-11 DIAGNOSIS — M81.0: ICD-10-CM

## 2020-02-11 DIAGNOSIS — M19.90: ICD-10-CM

## 2020-02-11 DIAGNOSIS — Z79.899: ICD-10-CM

## 2020-02-11 DIAGNOSIS — K27.9: ICD-10-CM

## 2020-02-11 DIAGNOSIS — C52: Primary | ICD-10-CM

## 2020-02-11 LAB
ALBUMIN SERPL BCG-MCNC: 2.5 GM/DL (ref 3.2–5.2)
ALT SERPL W P-5'-P-CCNC: 14 U/L (ref 12–78)
BASOPHILS # BLD AUTO: 0 10^3/UL (ref 0–0.2)
BASOPHILS NFR BLD AUTO: 0.3 % (ref 0–1)
BILIRUB SERPL-MCNC: 0.6 MG/DL (ref 0.2–1)
BUN SERPL-MCNC: 12 MG/DL (ref 7–18)
CALCIUM SERPL-MCNC: 11.6 MG/DL (ref 8.8–10.2)
CHLORIDE SERPL-SCNC: 102 MEQ/L (ref 98–107)
CO2 SERPL-SCNC: 32 MEQ/L (ref 21–32)
CREAT SERPL-MCNC: 0.64 MG/DL (ref 0.55–1.3)
EOSINOPHIL # BLD AUTO: 0.1 10^3/UL (ref 0–0.5)
EOSINOPHIL NFR BLD AUTO: 0.9 % (ref 0–3)
GFR SERPL CREATININE-BSD FRML MDRD: > 60 ML/MIN/{1.73_M2} (ref 32–?)
GLUCOSE SERPL-MCNC: 103 MG/DL (ref 70–100)
HCT VFR BLD AUTO: 35.6 % (ref 36–47)
HGB BLD-MCNC: 11.1 G/DL (ref 12–15.5)
LYMPHOCYTES # BLD AUTO: 1 10^3/UL (ref 1.5–5)
LYMPHOCYTES NFR BLD AUTO: 7.8 % (ref 24–44)
MCH RBC QN AUTO: 30.3 PG (ref 27–33)
MCHC RBC AUTO-ENTMCNC: 31.2 G/DL (ref 32–36.5)
MCV RBC AUTO: 97.3 FL (ref 80–96)
MONOCYTES # BLD AUTO: 0.6 10^3/UL (ref 0–0.8)
MONOCYTES NFR BLD AUTO: 4.8 % (ref 0–5)
NEUTROPHILS # BLD AUTO: 10.5 10^3/UL (ref 1.5–8.5)
NEUTROPHILS NFR BLD AUTO: 85.5 % (ref 36–66)
PLATELET # BLD AUTO: 328 10^3/UL (ref 150–450)
POTASSIUM SERPL-SCNC: 3.1 MEQ/L (ref 3.5–5.1)
PROT SERPL-MCNC: 6.3 GM/DL (ref 6.4–8.2)
RBC # BLD AUTO: 3.66 10^6/UL (ref 4–5.4)
SODIUM SERPL-SCNC: 140 MEQ/L (ref 136–145)
WBC # BLD AUTO: 12.3 10^3/UL (ref 4–10)

## 2020-02-11 PROCEDURE — 85027 COMPLETE CBC AUTOMATED: CPT

## 2020-02-11 PROCEDURE — 87086 URINE CULTURE/COLONY COUNT: CPT

## 2020-02-11 PROCEDURE — 80053 COMPREHEN METABOLIC PANEL: CPT

## 2020-02-11 PROCEDURE — 81001 URINALYSIS AUTO W/SCOPE: CPT

## 2020-02-11 PROCEDURE — 86300 IMMUNOASSAY TUMOR CA 15-3: CPT

## 2020-02-11 PROCEDURE — 36415 COLL VENOUS BLD VENIPUNCTURE: CPT

## 2020-02-11 PROCEDURE — 82378 CARCINOEMBRYONIC ANTIGEN: CPT

## 2020-02-12 NOTE — MEDONC
MEDICAL ONCOLOGY OFFICE NOTE

 

DATE OF ENCOUNTER:  02/11/2020

 

IDENTIFICATION AND CHIEF COMPLAINT:

 

Jagruti Dowell is a key 89-year-old  woman with a history of squamous

cell carcinoma of the vagina, now recurrent and locally advanced, who returns for

reevaluation reporting "I am uncomfortable when I sit."

 

HISTORY OF PRESENT ILLNESS:

 

Jagruti Dowell is a key 89-year-old woman whose history of present illness

dates to the year 2015, when she was first diagnosed as having squamous cell

carcinoma of the vulva.  She was treated at that time using a combination of

chemotherapy with external beam radiation therapy with treatment completed in

November 2015, the patient having received a total of 4500 cGy.  Although

brachytherapy was also recommended, she declined that modality of treatment.  She

subsequently underwent carbon dioxide laser ablation in 2016 as treatment of a

vaginal recurrence.  In April 2017, she was found to have high-grade squamous

intraepithelial neoplasia and was advised to have systemic therapy using

carboplatin and paclitaxel with Avastin.  She declined that therapy at the time.

A followup Pap smear in July 2017 was suspicious for squamous cell carcinoma, and

vaginal biopsy documented neoplasia.  She was then treated using JOY

brachytherapy in December 2017.  However, a followup biopsy in June 2019 showed

invasive squamous cell carcinoma.  Imaging studies showed multiple hemangiomas in

the liver and the patient was evaluated by Dr. Ruffin of gynecologic oncology in

[place], who again recommended systemic therapy using carboplatin with Taxol and

Avastin.  The patient again declined therapy.  She was last seen in this practice

on January 24, 2020 when she was again offered systemic antineoplastic therapy.

At that time, she was accompanied by her to her daughters, and it was determined

that the patient would discuss chemotherapy with her family prior to making a

decision.

 

Ms. Dowell returns at this time reporting ongoing pelvic discomfort.  The

patient's children have been taking meticulous care of her, turning her

frequently and helping her out of bed, however the family reports the patient

sleeps much of the time.  They report no skin breakdown but significant periods

of impaired memory on the patient's part.  She has had no fevers, chills nor

sweats documented, but does admit to being uncomfortable, particularly when

sitting.  She is unable to quantify the severity of her pain.

 

ALLERGIES:

 

The patient reports allergies to SULFAMIDE ANTIBIOTICS.

 

CURRENT MEDICATIONS:

 

- calcium/vitamin D 1 tablet p.o. q. day

- levothyroxine 158 mcg p.o. q. day

- multivitamin 1 tablet p.o. q. day

- quinapril 20 mg p.o. q. day

- sertraline 50 mg p.o. q. day

 

PAST MEDICAL HISTORY, SOCIAL HISTORY AND FAMILY HISTORY:

 

The patient's past medical, social and family history are as documented in

medical oncology office notes of December 13, 2019.

 

REVIEW OF SYSTEMS:

 

Neurologic:  History of impaired memory.  No history of head trauma.  No seizure

disorder.  No focal neurologic deficits.

Respiratory:  No shortness of breath.  No chest pain.  No cough.

Cardiac:  No history of myocardial infarction.  No exertional chest pressure.  No

palpitations.  No orthopnea.  No leg edema.

Gastrointestinal:  No recent nausea, vomiting, abdominal pain or diarrhea.

Genitourinary:  Intermittent dysuria since she was a vaginal discomfort.  Recent

decrease in urine output with occasional foul smelling urine.

Constitutional:  No recent fevers, chills nor sweats.  The patient does report

easy fatiguability and sleepiness.

The remainder of the review of systems was obtained and was negative.

 

PHYSICAL EXAMINATION:

 

The patient is a thin well-developed  woman awake, alert and fully

oriented, friendly and cooperative, in no acute distress, who appears younger

than her stated age, but has clearly lost weight over the last 2 months.

Height 160 cm, weight 46.3 kg, body mass index 18.1 kg per meter squared.

Temperature 97.7, pulse 79, respirations 18, blood pressure 117/74, oxygen

saturation 92% on room air.

Skin:  Full turgor, anicteric and without focal wounds.

HEENT Examination:  Normocephalic, atraumatic, but with bitemporal wasting.

Pupils round and reactive, extraocular muscles intact.  Sclerae anicteric.

Oropharynx without lesions.

Neck:  Supple without thyromegaly.

Lymphatics:  No pathologic lymphadenopathy noted.

Breast Examination:  Deferred.

Lungs:  Clear to auscultation.

Cardiac Exam:  Regular rhythm, point of maximal impulse nondisplaced.  S1, S2,

without gallop, rub or murmur.

Abdomen:  Active bowel sounds, soft, flat, nontender without appreciable

organomegaly.

Pelvic Examination:  Deferred.

Rectal Examination:  Deferred.

Extremities:  Without clubbing, cyanosis or edema.

Neurologic Exam:  Mental status grossly intact.  Motor and sensory grossly

intact.

 

LABORATORY DATA:

 

Laboratory studies dated February 11, 2020 include white blood count 12,100 per

microliter, hemoglobin 11.1 grams per decaliter, hematocrit 35.6%, platelet count

328,000.  BUN 12, creatinine 0.64 mg per decaliter, glucose 103 mg per decaliter,

albumin decreased at 2.5 grams per decaliter.

 

IMPRESSION:

 

Squamous cell carcinoma.  The patient has locally advanced squamous cell

carcinoma, and expresses no desire to proceed with chemotherapy.  The patient has

a declining Karnofsky performance status, and the following discussion with the

patient and her three children who accompanied her at this time, it was

recommended the patient have evaluation for hospice care.  The patient and her

family were receptive to evaluation by hospice nursing.  With respect to the

patient's pelvic discomfort and pain, it was recommended that a trial of

Duragesic 12 mcg per hour patch be undertaken to determine if this makes the

patient more comfortable.  Caution regarding respiratory depression and

somnolence was reviewed with the patient and her family, and it was recommended

that when the patch was applied that the patient has family members available to

monitor her for the first 4 hours to ensure the patient is not excessively

sedated.

 

PLAN:

 

Referral to hospice nursing was made and a Duragesic 12 mcg per hour patch was

prescribed.  The patient was scheduled to return to the practice in [facility] in 2 weeks, or sooner if the need arises.  The patient and

her family concurred with this plan.

 

 

 

Electronically Signed by

Taras Britton MD 02/13/2020 09:41 A

 

 

 

 

 

 

 

 

DD:  Taras Britton MD 02/11/2020 05:23 P

DT:  roawn 02/12/2020 11:31 A

 

CC: